# Patient Record
Sex: MALE | Race: WHITE | NOT HISPANIC OR LATINO
[De-identification: names, ages, dates, MRNs, and addresses within clinical notes are randomized per-mention and may not be internally consistent; named-entity substitution may affect disease eponyms.]

---

## 2020-10-29 ENCOUNTER — TRANSCRIPTION ENCOUNTER (OUTPATIENT)
Age: 45
End: 2020-10-29

## 2022-07-08 ENCOUNTER — APPOINTMENT (OUTPATIENT)
Dept: RHEUMATOLOGY | Facility: CLINIC | Age: 47
End: 2022-07-08

## 2022-07-08 VITALS
DIASTOLIC BLOOD PRESSURE: 83 MMHG | HEIGHT: 71 IN | OXYGEN SATURATION: 96 % | SYSTOLIC BLOOD PRESSURE: 133 MMHG | TEMPERATURE: 96.7 F | BODY MASS INDEX: 39.2 KG/M2 | HEART RATE: 79 BPM | WEIGHT: 280 LBS

## 2022-07-08 DIAGNOSIS — Z87.09 PERSONAL HISTORY OF OTHER DISEASES OF THE RESPIRATORY SYSTEM: ICD-10-CM

## 2022-07-08 DIAGNOSIS — Z86.79 PERSONAL HISTORY OF OTHER DISEASES OF THE CIRCULATORY SYSTEM: ICD-10-CM

## 2022-07-08 DIAGNOSIS — Z86.39 PERSONAL HISTORY OF OTHER ENDOCRINE, NUTRITIONAL AND METABOLIC DISEASE: ICD-10-CM

## 2022-07-08 PROCEDURE — 99204 OFFICE O/P NEW MOD 45 MIN: CPT

## 2022-07-08 RX ORDER — COLCHICINE 0.6 MG/1
0.6 TABLET ORAL DAILY
Qty: 30 | Refills: 2 | Status: ACTIVE | COMMUNITY
Start: 2022-07-08 | End: 1900-01-01

## 2022-07-08 RX ORDER — PREDNISONE 5 MG/1
5 TABLET ORAL
Qty: 20 | Refills: 0 | Status: ACTIVE | COMMUNITY
Start: 2022-07-08 | End: 1900-01-01

## 2022-07-13 PROBLEM — Z87.09 HISTORY OF ASTHMA: Status: RESOLVED | Noted: 2022-07-13 | Resolved: 2022-07-13

## 2022-07-13 PROBLEM — Z86.39 HISTORY OF HIGH CHOLESTEROL: Status: RESOLVED | Noted: 2022-07-13 | Resolved: 2022-07-13

## 2022-07-13 PROBLEM — Z86.79 HISTORY OF HYPERTENSION: Status: RESOLVED | Noted: 2022-07-13 | Resolved: 2022-07-13

## 2022-08-19 ENCOUNTER — APPOINTMENT (OUTPATIENT)
Dept: RHEUMATOLOGY | Facility: CLINIC | Age: 47
End: 2022-08-19

## 2022-08-19 VITALS — SYSTOLIC BLOOD PRESSURE: 119 MMHG | DIASTOLIC BLOOD PRESSURE: 77 MMHG | HEART RATE: 85 BPM

## 2022-08-19 VITALS — TEMPERATURE: 96.9 F

## 2022-08-19 DIAGNOSIS — D69.6 THROMBOCYTOPENIA, UNSPECIFIED: ICD-10-CM

## 2022-08-19 DIAGNOSIS — N28.9 DISORDER OF KIDNEY AND URETER, UNSPECIFIED: ICD-10-CM

## 2022-08-19 PROCEDURE — 99214 OFFICE O/P EST MOD 30 MIN: CPT

## 2022-08-24 PROBLEM — N28.9 RENAL INSUFFICIENCY: Status: ACTIVE | Noted: 2022-08-24

## 2022-08-24 PROBLEM — D69.6 THROMBOCYTOPENIA: Status: ACTIVE | Noted: 2022-08-24

## 2022-11-28 ENCOUNTER — FORM ENCOUNTER (OUTPATIENT)
Age: 47
End: 2022-11-28

## 2022-11-28 ENCOUNTER — APPOINTMENT (OUTPATIENT)
Dept: HEPATOLOGY | Facility: CLINIC | Age: 47
End: 2022-11-28

## 2022-11-28 VITALS
HEART RATE: 82 BPM | RESPIRATION RATE: 16 BRPM | TEMPERATURE: 97.3 F | DIASTOLIC BLOOD PRESSURE: 83 MMHG | SYSTOLIC BLOOD PRESSURE: 130 MMHG | BODY MASS INDEX: 41.72 KG/M2 | WEIGHT: 298 LBS | OXYGEN SATURATION: 95 % | HEIGHT: 71 IN

## 2022-11-28 PROCEDURE — 99204 OFFICE O/P NEW MOD 45 MIN: CPT

## 2022-12-01 LAB
AFP-TM SERPL-MCNC: <1.8 NG/ML
ALBUMIN SERPL ELPH-MCNC: 4.8 G/DL
ALP BLD-CCNC: 50 U/L
ALT SERPL-CCNC: 195 U/L
ANION GAP SERPL CALC-SCNC: 13 MMOL/L
AST SERPL-CCNC: 106 U/L
BASOPHILS # BLD AUTO: 0.05 K/UL
BASOPHILS NFR BLD AUTO: 0.9 %
BILIRUB SERPL-MCNC: 0.8 MG/DL
BUN SERPL-MCNC: 20 MG/DL
CALCIUM SERPL-MCNC: 10 MG/DL
CHLORIDE SERPL-SCNC: 97 MMOL/L
CO2 SERPL-SCNC: 28 MMOL/L
CREAT SERPL-MCNC: 1.24 MG/DL
EBV DNA SERPL NAA+PROBE-ACNC: NOT DETECTED IU/ML
EBVPCR LOG: NOT DETECTED LOG10IU/ML
EGFR: 72 ML/MIN/1.73M2
EOSINOPHIL # BLD AUTO: 0.11 K/UL
EOSINOPHIL NFR BLD AUTO: 1.9 %
FERRITIN SERPL-MCNC: 775 NG/ML
GGT SERPL-CCNC: 136 U/L
GLUCOSE SERPL-MCNC: 131 MG/DL
HCT VFR BLD CALC: 46.1 %
HGB BLD-MCNC: 15.9 G/DL
IMM GRANULOCYTES NFR BLD AUTO: 0.7 %
INR PPP: 0.97 RATIO
IRON SATN MFR SERPL: 44 %
IRON SERPL-MCNC: 144 UG/DL
LYMPHOCYTES # BLD AUTO: 1.45 K/UL
LYMPHOCYTES NFR BLD AUTO: 24.7 %
MAN DIFF?: NORMAL
MCHC RBC-ENTMCNC: 31.4 PG
MCHC RBC-ENTMCNC: 34.5 GM/DL
MCV RBC AUTO: 90.9 FL
MONOCYTES # BLD AUTO: 0.63 K/UL
MONOCYTES NFR BLD AUTO: 10.7 %
NEUTROPHILS # BLD AUTO: 3.59 K/UL
NEUTROPHILS NFR BLD AUTO: 61.1 %
PLATELET # BLD AUTO: 109 K/UL
POTASSIUM SERPL-SCNC: 4.2 MMOL/L
PROT SERPL-MCNC: 7.3 G/DL
PT BLD: 11.2 SEC
RBC # BLD: 5.07 M/UL
RBC # FLD: 13 %
SODIUM SERPL-SCNC: 138 MMOL/L
TIBC SERPL-MCNC: 324 UG/DL
UIBC SERPL-MCNC: 180 UG/DL
WBC # FLD AUTO: 5.87 K/UL

## 2022-12-05 LAB
HEPATITIS E IGM ABY: NOT DETECTED
HEV AB SER QL: POSITIVE

## 2022-12-07 LAB
LYSOSOMAL ACID LIPASE INTERPRETATION: NORMAL
LYSOSOMAL ACID LIPASE: 77 PMOL/HR/UL

## 2022-12-08 ENCOUNTER — APPOINTMENT (OUTPATIENT)
Dept: RHEUMATOLOGY | Facility: CLINIC | Age: 47
End: 2022-12-08

## 2022-12-08 VITALS
TEMPERATURE: 97.7 F | BODY MASS INDEX: 40.6 KG/M2 | SYSTOLIC BLOOD PRESSURE: 136 MMHG | WEIGHT: 290 LBS | DIASTOLIC BLOOD PRESSURE: 86 MMHG | HEART RATE: 88 BPM | HEIGHT: 71 IN | RESPIRATION RATE: 16 BRPM | OXYGEN SATURATION: 97 %

## 2022-12-08 DIAGNOSIS — R74.8 ABNORMAL LEVELS OF OTHER SERUM ENZYMES: ICD-10-CM

## 2022-12-08 DIAGNOSIS — M10.9 GOUT, UNSPECIFIED: ICD-10-CM

## 2022-12-08 DIAGNOSIS — E66.01 MORBID (SEVERE) OBESITY DUE TO EXCESS CALORIES: ICD-10-CM

## 2022-12-08 PROCEDURE — 99214 OFFICE O/P EST MOD 30 MIN: CPT

## 2022-12-08 NOTE — HISTORY OF PRESENT ILLNESS
[FreeTextEntry1] : 1st episode 1/2022\par left 1st MTP joint \par two flares in the begnining and now feels like it is present every other week \par started allopurinol 3/2022\par no colchicine\par acute flares treated with steroids and advil \par diet: prior to diagnosis of gout, a lot of red meat but now trying to eat more chicken/salads\par minimal sugar intake\par minimal beer intake and will drink vodka now\par PMHx: hypertension, high cholesterol, asthma \par s/p COVID 1 year ago and breathing never normalized after\par PSHx: tonsillectomy\par Allergies: seasonal\par \par 8/2022\par ____________ \par no flares since the last visit \par tolerating colchicine and allopurinol well \par stopped colchicine after 30 days as was told that he shouldn't be taking it for a long period of time \par labs reviewed: mild thrombocytopenia (etiology unclear) \par uric acid level less than 6 \par last Cr 1.4\par labs reviewed on patient's phone while logged in to patient's portal \par \par Interval history\par ____________ \par right 5th digit feels sore and swollen; it comes and goes and he self treats with prednisone 10 mg.  pain is not as severe as when the pain is in the toes\par pain is worse with the shoe on \par takes allopurinol and colchicine regularly \par adheres to strict diet and doesn't drink\par seen by hepatology and liver and sleep was found to be enlarged.  Confirmed fatty liver and was advised to loose weight. He is in the process of getting to see a weight loss .

## 2022-12-08 NOTE — PHYSICAL EXAM
[General Appearance - Alert] : alert [General Appearance - In No Acute Distress] : in no acute distress [Full Pulse] : the pedal pulses are present [Edema] : there was no peripheral edema [Abnormal Walk] : normal gait [Nail Clubbing] : no clubbing  or cyanosis of the fingernails [Musculoskeletal - Swelling] : no joint swelling seen [Motor Tone] : muscle strength and tone were normal [Oriented To Time, Place, And Person] : oriented to person, place, and time [Impaired Insight] : insight and judgment were intact [Affect] : the affect was normal [FreeTextEntry1] : no synovitis; no erythema

## 2022-12-08 NOTE — DATA REVIEWED
[FreeTextEntry1] : labs requested during the visit and subsequently reviewed \par uric acid 5.6 \par LFTs elevated\par

## 2022-12-08 NOTE — ASSESSMENT
[FreeTextEntry1] : 1. Gout\par no evidence of acute gout flare at this time \par suspect pain in the fifthe digit of the right foot is shoe wear related; f/u with podiatry \par continue with allopurinol. uric acid level at goal \par change colchicine to three times a week \par f/u 3 months \par 2. Obesity \par weight loss recommended \par number for medical weight management provided \par \par My collective time spent on today's visit was greater than 30 minutes and included: Preparation for the visit, review of the medical records, review of pertinent diagnostic studies, examination and counseling of the patient on the above diagnosis, treatment plan and prognosis, orders of diagnostic test, medications and or appropriate procedures and documentation in the medical record of today's visit.\par

## 2022-12-12 ENCOUNTER — APPOINTMENT (OUTPATIENT)
Dept: HEPATOLOGY | Facility: CLINIC | Age: 47
End: 2022-12-12

## 2022-12-19 ENCOUNTER — APPOINTMENT (OUTPATIENT)
Dept: HEPATOLOGY | Facility: CLINIC | Age: 47
End: 2022-12-19

## 2022-12-19 NOTE — HISTORY OF PRESENT ILLNESS
[FreeTextEntry1] : \par Bassem Pastrana is a 46 y/o male who was referred by Dr. Valdez for elevated LFTs. Here for follow up. Patient states his liver enzyme has been elevated since . He found to have NAFLD after PETscan in  when he tried to donate his liver to his father with Hep C cirrhosis c/b HCC. He has Gout, HTN,HLD, CARI, prediabetes, GERD, asthma. He reports social ETOH but drinks 10-20 High Noon (5%) when he drinks. He had keto diet in  and weight was 229. At that time he heard his liver# got better. However, he gained weight 290 lb during the pandemic. Then, the# went up again. He started comprehensive work up with Dr. Valdez from 2022. based on labs DDx was Rabdo vs EBV vs NAFLD vs DILI vs others. He stopped Lipitor. He also saw hematologist. Dr. Norton on 22 and had additional work up. His AST/ALT is uptrending 133/256 (53/125 in 2022).\par \par Today he complained of tiredness and mild muscle aches. Otherwise he denies any other physical complaints.\par \par Fibroscan ordered but need to schedule ( )\par 22\par  INR 0.97 AFP<1.8\par AST//195 ALP 50  \par Ferritin 775 Iron Sat 44% SERGEY-\par EBV PCR is not detected, so r/o EBV\par Hep E Ab positive but IgM Ab is Negative\par \par \par [Medical Hx] Gout, HTN, HLD, CARI, prediabetes, GERD, asthma, Mono at age 15\par [Surgical Hx] tonsillectomy, vasectomy\par [Social Hx] Occupation: \par Alcohol: social ETOH (once a week) but drinks 10-20 High Noon (5%) when he drinks.\par Tobacco: Quit in , 1PPD before\par illicit drug: No Herb and dietary Supplement: Protein shake or keto shake 1-2/day\par [FMH of liver disease] Father  from HCC at age 59\par \par [Current Medications]\par Famotidine\par Vitamin C\par Zinc\par Zyrtec\par MVI\par atorvastatin 10 mg tablet - stopped in 2022 due to elevated LFTs\par allopurinol 300 mg tablet\par testosterone cypionate 200 mg/mL intramuscular oil\par minocycline 100 mg capsule\par colchicine 0.6 mg tablet - TAKE 1 TABLET BY MOUTH EVERY DAY FOR 90 DAYS\par Edarbyclor 40 mg-25 mg tablet\par omeprazole 40 mg capsule, delayed release - 1 capsule by mouth daily \par valsartan 320 mg-hydrochlorothiazide 25 mg tablet \par fluticasone propionate 50 mcg/actuation nasal spray, suspension\par \par [Labs]\par 22 by hematology\par Glucose 96 BUN 25 Cr 1.35 GFR 65\par Na 137 K 4 Alb 4.8 TB 1.1 ALP 46 AST//265\par Creatine kinase 778(ref ), Aldolase 16.6 \par wbc 5.4 Hb 17.3 Hct 49.7 \par EBV VCAIGM+ IGG+ EBNA IGG+: interpretation is indeterminate\par Ferritin 647 Iron sat 39 \par CRP 2.6 Sed rate 2\par Hemochromatosis gene Negative\par Kappa light chain 21.5 (ref 2.3-19.4), Treasure and ratio wnl\par PLT antibody, direct, flow cytometry Negative\par \par 22\par BUN/Cr 33/1.44 Na 137 K4.7 Calcium 10.5 Alb 4.8 TB 0.8 ALP 41 AST/ALT 53/125 (78/145 in May)\par Uric acid 5.9 wbc 6.2 Hb 17  (128 in May)\par Lipid unremarkable, HbA1c 5.8 Iron sat 46% Ferritin 561\par \par From PCP note\par EBV Capsid IgM positive, HFE genetic analysis normal\par -778 (ref 10 to 120 mcg/L) Aldolase 14.1-16 (ref 1.0 to 7.5 units per liter)\par Fibrospec A3F0\par Sonogram with hepatomegaly and steatosis\par Negative: CMV, HSV, AMA, SMA, LKM, Ceruloplasmin, Celiac Serologies, A1At, IGA, IGG, IGM, HCV Ag, HBV Ag and core, HAV IgM. \par \par [Imaging]\par \par [Procedures]\par EGD  by Dr. Valdez: pt was told the result was normal\par Colonoscopy : pt was told the result was normal. one polyp removed(?)\par \par [Plan]\par \par # Transaminitis \par Given his hx of HTN, HLD, preDM, and drinking pattern (once a week but heavily when he drinks) suspected BAFLD or BASH\par - Will check EBV DNA PCR but less likely since EBV showed mix pattern such as elevated ALP > ruled out EBV based on 22\par - Consider liver biopsy based on blood work and fibroscan\par - Advised him to stop drinking alcohol\par - Education and counseling were provided to the patient.\par - Discussed at length with the patient that next course of action would depend on results\par - Labs \par - Fibroscan\par - RTO \par \par # mild muscle aches\par - His elevated muscle enzymes are probably not related to liver issue\par - Continue to f/u PCP, other speciaties, or Dr. Valdez \par - less likely muscular dystrophy because pt does not have FMH of muscle disease\par - Lipitor stopped (Lipitor can affect CPK)\par - If his CPK continues to increase, he needs to see neurologist for muscle biopsy\par - He states he feels mild muscle aches since doctors started asking him if he has muscle pain. \par - He has not been worked out over a few months. \par

## 2022-12-22 NOTE — REVIEW OF SYSTEMS
[Feeling Tired] : feeling tired [Negative] : Heme/Lymph [Recent Weight Loss (___ Lbs)] : recent [unfilled] ~Ulb weight loss [Fever] : no fever [Chills] : no chills [Abdominal Pain] : no abdominal pain [Vomiting] : no vomiting [Constipation] : no constipation [Diarrhea] : no diarrhea [Heartburn] : no heartburn [Melena] : no melena [FreeTextEntry2] : 229 to 290lb during the pandemic

## 2022-12-22 NOTE — HISTORY OF PRESENT ILLNESS
[FreeTextEntry1] : RFR: elevated LFTs \par Referring MD: Dr. Ventura Valdez MD\par Bassem Pastrana is a 45 y/o male who is referred by Dr. Valdez for elevated LFTs. Patient states his liver enzyme has been elevated since . He found to have NAFLD after PETscan in  when he tried to donate his liver to his father with Hep C cirrhosis c/b HCC. He has Gout, HTN,HLD, CARI, prediabetes, GERD, asthma. He reports social ETOH but drinks 10-20 High Noon (5%) when he drinks. He had keto diet in  and weight was 229. At that time he heard his liver# got better. However, he gained weight 290 lb during the pandemic. Then, the# went up again. He started comprehensive work up with Dr. Valdez from 2022. based on labs DDx was Rabdo vs EBV vs NAFLD vs DILI vs others. He stopped Lipitor. He also saw hematologist. Dr. Norton on 22 and had additional work up. His AST/ALT is uptrending 133/256 (53/125 in 2022).\par \par Today he complained of tiredness and mild muscle aches. Otherwise he denies any other physical complaints.\par \par [Medical Hx] Gout, HTN, HLD, CARI, prediabetes, GERD, asthma, Mono at age 15\par [Surgical Hx] tonsillectomy, vasectomy\par [Social Hx] Occupation: \par Alcohol:  social ETOH (once a week) but drinks 10-20 High Noon (5%) when he drinks.\par Tobacco: Quit in , 1PPD before\par illicit drug: No   Herb and dietary Supplement: Protein shake or keto shake 1-2/day\par [FMH of liver disease] Father  from HCC at age 59\par \par [Current Medications]\par Famotidine\par Vitamin C\par Zinc\par Zyrtec\par MVI\par atorvastatin 10 mg tablet - stopped in 2022 due to elevated LFTs\par allopurinol 300 mg tablet\par testosterone cypionate 200 mg/mL intramuscular oil\par minocycline 100 mg capsule\par colchicine 0.6 mg tablet - TAKE 1 TABLET BY MOUTH EVERY DAY FOR 90 DAYS\par Edarbyclor 40 mg-25 mg tablet\par omeprazole 40 mg capsule, delayed release - 1 capsule by mouth daily \par valsartan 320 mg-hydrochlorothiazide 25 mg tablet \par fluticasone propionate 50 mcg/actuation nasal spray, suspension\par \par [Labs]\par 22 by hematology\par Glucose 96 BUN 25 Cr 1.35 GFR 65\par Na 137 K 4 Alb 4.8 TB 1.1 ALP 46 AST//265\par Creatine kinase 778(ref ), Aldolase 16.6 \par wbc 5.4 Hb 17.3 Hct 49.7 \par EBV VCAIGM+ IGG+ EBNA IGG+: interpretation is indeterminate\par Ferritin 647 Iron sat 39 \par CRP 2.6 Sed rate 2\par Hemochromatosis gene Negative\par Kappa light chain 21.5 (ref 2.3-19.4), Treasure and ratio wnl\par PLT antibody, direct, flow cytometry Negative\par \par 22\par BUN/Cr 33/1.44 Na 137 K4.7 Calcium 10.5 Alb 4.8 TB 0.8 ALP 41 AST/ALT 53/125 (78/145 in May)\par Uric acid 5.9 wbc 6.2 Hb 17  (128 in May)\par Lipid unremarkable, HbA1c 5.8 Iron sat 46% Ferritin 561\par \par From PCP note\par EBV Capsid IgM positive, HFE genetic analysis normal\par -778 (ref 10 to 120 mcg/L) Aldolase 14.1-16 (ref 1.0 to 7.5 units per liter)\par Fibrospec A3F0\par Sonogram with hepatomegaly and steatosis\par Negative: CMV, HSV, AMA, SMA, LKM, Ceruloplasmin, Celiac Serologies, A1At, IGA, IGG, IGM, HCV Ag, HBV Ag and core, HAV IgM. \par \par [Imaging]\par \par [Procedures]\par EGD  by Dr. Valdez: pt was told the result was normal\par Colonoscopy : pt was told the result was normal. one polyp removed(?)\par \par

## 2022-12-22 NOTE — ASSESSMENT
[FreeTextEntry1] : Bassem Pastrana is a 47 y/o male who is referred by Dr. Valdez for elevated LFTs. Patient states his liver enzyme has been elevated since 2005. He found to have NAFLD after PETscan in 2006 when he tried to donate his liver to his father with Hep C cirrhosis c/b HCC. He has Gout, HTN,HLD, CARI, prediabetes, GERD, asthma. He reports social ETOH but drinks 10-20 High Noon (5%) when he drinks. He had keto diet in 2019 and weight was 229. At that time he heard his liver# got better. However, he gained weight 290 lb during the pandemic. Then, the# went up again. \par \par [Plan]\par \par # Transaminitis \par Given his hx of HTN, HLD, preDM, and drinking pattern (once a week but heavily when he drinks) suspected BAFLD or BASH\par - Will check EBV DNA PCR but less likely since EBV showed mix pattern such as elevated ALP\par - Consider liver biopsy  based on blood work and fibroscan\par - Advised him to stop drinking alcohol\par - Education and counseling were provided to the patient.\par - Discussed at length with the patient that next course of action would depend on results\par - Labs today\par - Fibroscan in a month\par - RTO in a month\par \par # mild muscle aches\par - His elevated muscle enzymes are probably not related to liver issue\par - Continue to f/u PCP, other speciaties, or Dr. Valdez \par - less likely muscular dystrophy because pt does not have FMH of muscle disease\par - Lipitor stopped (Lipitor can affect CPK)\par - If his CPK continues to increase, he needs to see neurologist for muscle biopsy\par - He states he feels mild muscle aches since doctors started asking him if he has muscle pain. \par - He has not been worked out over a few months

## 2022-12-22 NOTE — PHYSICAL EXAM
[General Appearance - Alert] : alert [General Appearance - In No Acute Distress] : in no acute distress [Oriented To Time, Place, And Person] : oriented to person, place, and time [Impaired Insight] : insight and judgment were intact [Affect] : the affect was normal [Scleral Icterus] : No Scleral Icterus [Spider Angioma] : No spider angioma(s) were observed [Abdominal  Ascites] : no ascites [Asterixis] : no asterixis observed [Jaundice] : No jaundice [Palmar Erythema] : no Palmar Erythema [Depression] : no depression

## 2022-12-22 NOTE — ADDENDUM
[FreeTextEntry1] : [Addendum on 12/1/22]\par  INR 0.97 AFP<1.8\par AST//195 ALP 50  \par Ferritin 775  Iron Sat 44%\par EBV PCR is not detected, so r/o EBV\par Hep E Ab positive but IgM Ab is Negative\par \par Pending: Hep E, SERGEY\par waiting for Fibrosis\par RTO as scheduled